# Patient Record
Sex: MALE | Race: WHITE | Employment: OTHER | URBAN - METROPOLITAN AREA
[De-identification: names, ages, dates, MRNs, and addresses within clinical notes are randomized per-mention and may not be internally consistent; named-entity substitution may affect disease eponyms.]

---

## 2017-01-01 ENCOUNTER — APPOINTMENT (OUTPATIENT)
Dept: GENERAL RADIOLOGY | Facility: HOSPITAL | Age: 82
DRG: 308 | End: 2017-01-01
Attending: EMERGENCY MEDICINE
Payer: MEDICARE

## 2017-01-01 ENCOUNTER — TELEPHONE (OUTPATIENT)
Dept: INTERVENTIONAL RADIOLOGY/VASCULAR | Facility: HOSPITAL | Age: 82
End: 2017-01-01

## 2017-01-01 ENCOUNTER — TELEPHONE (OUTPATIENT)
Dept: HEMATOLOGY/ONCOLOGY | Facility: HOSPITAL | Age: 82
End: 2017-01-01

## 2017-01-01 ENCOUNTER — OFFICE VISIT (OUTPATIENT)
Dept: HEMATOLOGY/ONCOLOGY | Age: 82
End: 2017-01-01
Attending: INTERNAL MEDICINE
Payer: MEDICARE

## 2017-01-01 ENCOUNTER — HOSPITAL ENCOUNTER (INPATIENT)
Facility: HOSPITAL | Age: 82
LOS: 3 days | Discharge: HOME | DRG: 308 | End: 2017-01-01
Attending: EMERGENCY MEDICINE | Admitting: HOSPITALIST
Payer: MEDICARE

## 2017-01-01 ENCOUNTER — APPOINTMENT (OUTPATIENT)
Dept: CV DIAGNOSTICS | Facility: HOSPITAL | Age: 82
DRG: 308 | End: 2017-01-01
Attending: INTERNAL MEDICINE
Payer: MEDICARE

## 2017-01-01 ENCOUNTER — HOSPITAL ENCOUNTER (OUTPATIENT)
Dept: NUCLEAR MEDICINE | Facility: HOSPITAL | Age: 82
Discharge: HOME | End: 2017-01-01
Attending: INTERNAL MEDICINE
Payer: COMMERCIAL

## 2017-01-01 VITALS
TEMPERATURE: 98 F | DIASTOLIC BLOOD PRESSURE: 80 MMHG | RESPIRATION RATE: 17 BRPM | HEIGHT: 69 IN | WEIGHT: 135.88 LBS | HEART RATE: 93 BPM | SYSTOLIC BLOOD PRESSURE: 118 MMHG | BODY MASS INDEX: 20.12 KG/M2 | OXYGEN SATURATION: 98 %

## 2017-01-01 VITALS
WEIGHT: 137 LBS | SYSTOLIC BLOOD PRESSURE: 100 MMHG | HEART RATE: 78 BPM | OXYGEN SATURATION: 100 % | TEMPERATURE: 97 F | DIASTOLIC BLOOD PRESSURE: 68 MMHG | BODY MASS INDEX: 20 KG/M2 | RESPIRATION RATE: 18 BRPM

## 2017-01-01 DIAGNOSIS — C61 PROSTATE CANCER (HCC): Primary | ICD-10-CM

## 2017-01-01 DIAGNOSIS — Z85.01 PERSONAL HISTORY OF ESOPHAGEAL CANCER: ICD-10-CM

## 2017-01-01 DIAGNOSIS — I48.91 ATRIAL FIBRILLATION WITH RAPID VENTRICULAR RESPONSE (HCC): Primary | ICD-10-CM

## 2017-01-01 DIAGNOSIS — C61 PROSTATE CANCER (HCC): ICD-10-CM

## 2017-01-01 DIAGNOSIS — R73.9 HYPERGLYCEMIA: ICD-10-CM

## 2017-01-01 DIAGNOSIS — I48.91 ATRIAL FIBRILLATION (HCC): ICD-10-CM

## 2017-01-01 DIAGNOSIS — R63.4 WEIGHT LOSS: ICD-10-CM

## 2017-01-01 DIAGNOSIS — F03.90 DEMENTIA WITHOUT BEHAVIORAL DISTURBANCE, UNSPECIFIED DEMENTIA TYPE (HCC): ICD-10-CM

## 2017-01-01 DIAGNOSIS — R77.8 ELEVATED TROPONIN: ICD-10-CM

## 2017-01-01 DIAGNOSIS — D69.6 THROMBOCYTOPENIA (HCC): ICD-10-CM

## 2017-01-01 PROCEDURE — 93306 TTE W/DOPPLER COMPLETE: CPT

## 2017-01-01 PROCEDURE — 99214 OFFICE O/P EST MOD 30 MIN: CPT | Performed by: INTERNAL MEDICINE

## 2017-01-01 PROCEDURE — 99223 1ST HOSP IP/OBS HIGH 75: CPT | Performed by: INTERNAL MEDICINE

## 2017-01-01 PROCEDURE — 99232 SBSQ HOSP IP/OBS MODERATE 35: CPT | Performed by: INTERNAL MEDICINE

## 2017-01-01 PROCEDURE — 99239 HOSP IP/OBS DSCHRG MGMT >30: CPT | Performed by: INTERNAL MEDICINE

## 2017-01-01 PROCEDURE — 78399 UNLISTED MUSCSKEL PX DX NUC: CPT

## 2017-01-01 PROCEDURE — 78320 NM BONE SPECT WITH CT (CPT=78306/78320/78399): CPT

## 2017-01-01 PROCEDURE — 78306 BONE IMAGING WHOLE BODY: CPT

## 2017-01-01 PROCEDURE — 71010 XR CHEST AP PORTABLE  (CPT=71010): CPT

## 2017-01-01 PROCEDURE — 93306 TTE W/DOPPLER COMPLETE: CPT | Performed by: INTERNAL MEDICINE

## 2017-01-01 RX ORDER — FUROSEMIDE 20 MG/1
20 TABLET ORAL
Status: ON HOLD | COMMUNITY
End: 2017-01-01

## 2017-01-01 RX ORDER — ASPIRIN 81 MG/1
81 TABLET, CHEWABLE ORAL DAILY
Status: DISCONTINUED | OUTPATIENT
Start: 2017-01-01 | End: 2017-01-01

## 2017-01-01 RX ORDER — SODIUM PHOSPHATE, DIBASIC AND SODIUM PHOSPHATE, MONOBASIC 7; 19 G/133ML; G/133ML
1 ENEMA RECTAL ONCE AS NEEDED
Status: ACTIVE | OUTPATIENT
Start: 2017-01-01 | End: 2017-01-01

## 2017-01-01 RX ORDER — LORAZEPAM 2 MG/ML
0.5 INJECTION INTRAMUSCULAR EVERY 30 MIN PRN
Status: DISCONTINUED | OUTPATIENT
Start: 2017-01-01 | End: 2017-01-01

## 2017-01-01 RX ORDER — MELATONIN
100 DAILY
Status: DISCONTINUED | OUTPATIENT
Start: 2017-01-01 | End: 2017-01-01

## 2017-01-01 RX ORDER — DILTIAZEM HYDROCHLORIDE 120 MG/1
120 CAPSULE, EXTENDED RELEASE ORAL DAILY
Status: DISCONTINUED | OUTPATIENT
Start: 2017-01-01 | End: 2017-01-01

## 2017-01-01 RX ORDER — BISACODYL 10 MG
10 SUPPOSITORY, RECTAL RECTAL
Status: DISCONTINUED | OUTPATIENT
Start: 2017-01-01 | End: 2017-01-01

## 2017-01-01 RX ORDER — QUETIAPINE 25 MG/1
25 TABLET, FILM COATED ORAL NIGHTLY PRN
Status: DISCONTINUED | OUTPATIENT
Start: 2017-01-01 | End: 2017-01-01

## 2017-01-01 RX ORDER — LISINOPRIL 2.5 MG/1
5 TABLET ORAL DAILY
Status: DISCONTINUED | OUTPATIENT
Start: 2017-01-01 | End: 2017-01-01

## 2017-01-01 RX ORDER — HYDROCODONE BITARTRATE AND ACETAMINOPHEN 10; 325 MG/1; MG/1
1 TABLET ORAL EVERY 6 HOURS PRN
Status: DISCONTINUED | OUTPATIENT
Start: 2017-01-01 | End: 2017-01-01

## 2017-01-01 RX ORDER — FOLIC ACID 1 MG/1
1 TABLET ORAL DAILY
Qty: 30 TABLET | Refills: 0 | Status: SHIPPED | OUTPATIENT
Start: 2017-01-01

## 2017-01-01 RX ORDER — CHOLECALCIFEROL (VITAMIN D3) 125 MCG
2000 CAPSULE ORAL DAILY
COMMUNITY

## 2017-01-01 RX ORDER — LISINOPRIL 5 MG/1
5 TABLET ORAL DAILY
Qty: 30 TABLET | Refills: 11 | Status: SHIPPED | OUTPATIENT
Start: 2017-01-01

## 2017-01-01 RX ORDER — POTASSIUM CITRATE 10 MEQ/1
10 TABLET, EXTENDED RELEASE ORAL SEE ADMIN INSTRUCTIONS
Status: ON HOLD | COMMUNITY
End: 2017-01-01

## 2017-01-01 RX ORDER — ACETAMINOPHEN 325 MG/1
650 TABLET ORAL EVERY 6 HOURS PRN
Status: DISCONTINUED | OUTPATIENT
Start: 2017-01-01 | End: 2017-01-01

## 2017-01-01 RX ORDER — SODIUM CHLORIDE 9 MG/ML
INJECTION, SOLUTION INTRAVENOUS ONCE
Status: COMPLETED | OUTPATIENT
Start: 2017-01-01 | End: 2017-01-01

## 2017-01-01 RX ORDER — HEPARIN SODIUM 5000 [USP'U]/ML
5000 INJECTION, SOLUTION INTRAVENOUS; SUBCUTANEOUS EVERY 12 HOURS
Status: DISCONTINUED | OUTPATIENT
Start: 2017-01-01 | End: 2017-01-01

## 2017-01-01 RX ORDER — POLYETHYLENE GLYCOL 3350 17 G/17G
17 POWDER, FOR SOLUTION ORAL DAILY PRN
Status: DISCONTINUED | OUTPATIENT
Start: 2017-01-01 | End: 2017-01-01

## 2017-01-01 RX ORDER — DILTIAZEM HYDROCHLORIDE 120 MG/1
120 CAPSULE, COATED, EXTENDED RELEASE ORAL DAILY
COMMUNITY

## 2017-01-01 RX ORDER — CARVEDILOL 6.25 MG/1
6.25 TABLET ORAL 2 TIMES DAILY WITH MEALS
Status: DISCONTINUED | OUTPATIENT
Start: 2017-01-01 | End: 2017-01-01

## 2017-01-01 RX ORDER — MELATONIN
100 DAILY
Qty: 30 TABLET | Refills: 0 | Status: SHIPPED | OUTPATIENT
Start: 2017-01-01 | End: 2017-01-01

## 2017-01-01 RX ORDER — ONDANSETRON 2 MG/ML
4 INJECTION INTRAMUSCULAR; INTRAVENOUS EVERY 6 HOURS PRN
Status: DISCONTINUED | OUTPATIENT
Start: 2017-01-01 | End: 2017-01-01

## 2017-01-01 RX ORDER — HYDROMORPHONE HYDROCHLORIDE 1 MG/ML
0.5 INJECTION, SOLUTION INTRAMUSCULAR; INTRAVENOUS; SUBCUTANEOUS
Status: DISCONTINUED | OUTPATIENT
Start: 2017-01-01 | End: 2017-01-01

## 2017-01-01 RX ORDER — HYDROCODONE BITARTRATE AND ACETAMINOPHEN 10; 325 MG/1; MG/1
2 TABLET ORAL EVERY 6 HOURS PRN
Status: DISCONTINUED | OUTPATIENT
Start: 2017-01-01 | End: 2017-01-01

## 2017-01-01 RX ORDER — FOLIC ACID 1 MG/1
1 TABLET ORAL DAILY
Qty: 30 TABLET | Refills: 0 | Status: SHIPPED | OUTPATIENT
Start: 2017-01-01 | End: 2017-01-01

## 2017-01-01 RX ORDER — FOLIC ACID 1 MG/1
1 TABLET ORAL DAILY
Status: DISCONTINUED | OUTPATIENT
Start: 2017-01-01 | End: 2017-01-01

## 2017-01-01 RX ORDER — LISINOPRIL 10 MG/1
10 TABLET ORAL EVERY EVENING
Status: ON HOLD | COMMUNITY
End: 2017-01-01

## 2017-01-01 RX ORDER — MELATONIN
100 DAILY
Qty: 30 TABLET | Refills: 0 | Status: SHIPPED | OUTPATIENT
Start: 2017-01-01

## 2017-01-01 RX ORDER — DILTIAZEM HYDROCHLORIDE 5 MG/ML
10 INJECTION INTRAVENOUS
Status: DISPENSED | OUTPATIENT
Start: 2017-01-01 | End: 2017-01-01

## 2017-01-01 RX ORDER — LORAZEPAM 2 MG/ML
1 INJECTION INTRAMUSCULAR EVERY 30 MIN PRN
Status: DISCONTINUED | OUTPATIENT
Start: 2017-01-01 | End: 2017-01-01

## 2017-01-01 RX ORDER — HALOPERIDOL 5 MG/ML
0.5 INJECTION INTRAMUSCULAR EVERY 6 HOURS PRN
Status: DISCONTINUED | OUTPATIENT
Start: 2017-01-01 | End: 2017-01-01

## 2017-01-01 RX ORDER — GABAPENTIN 100 MG/1
100 CAPSULE ORAL 3 TIMES DAILY
Status: DISCONTINUED | OUTPATIENT
Start: 2017-01-01 | End: 2017-01-01

## 2017-01-01 RX ORDER — DILTIAZEM HYDROCHLORIDE 120 MG/1
120 CAPSULE, COATED, EXTENDED RELEASE ORAL DAILY
COMMUNITY
End: 2017-01-01

## 2017-01-01 RX ORDER — CARVEDILOL 6.25 MG/1
6.25 TABLET ORAL 2 TIMES DAILY WITH MEALS
COMMUNITY

## 2017-01-09 ENCOUNTER — PRIOR ORIGINAL RECORDS (OUTPATIENT)
Dept: OTHER | Age: 82
End: 2017-01-09

## 2017-01-09 ENCOUNTER — MYAURORA ACCOUNT LINK (OUTPATIENT)
Dept: OTHER | Age: 82
End: 2017-01-09

## 2017-01-09 NOTE — PROGRESS NOTES
Met with patient and his family in Dr. Indio Lynch clinic. WATCHMAN process and procedure reviewed. Educational folder given, along with contact information. Patient and family to decide together if they would like to move forward.

## 2017-01-13 ENCOUNTER — PRIOR ORIGINAL RECORDS (OUTPATIENT)
Dept: OTHER | Age: 82
End: 2017-01-13

## 2017-04-05 PROBLEM — D69.6 THROMBOCYTOPENIA (HCC): Status: ACTIVE | Noted: 2017-01-01

## 2017-04-05 PROBLEM — Z91.81 AT RISK FOR FALLING: Status: ACTIVE | Noted: 2017-01-01

## 2017-04-05 PROBLEM — R73.9 HYPERGLYCEMIA: Status: ACTIVE | Noted: 2017-01-01

## 2017-04-05 PROBLEM — R77.8 ELEVATED TROPONIN: Status: ACTIVE | Noted: 2017-01-01

## 2017-04-05 PROBLEM — I48.91 ATRIAL FIBRILLATION WITH RAPID VENTRICULAR RESPONSE (HCC): Status: ACTIVE | Noted: 2017-01-01

## 2017-04-05 NOTE — ED INITIAL ASSESSMENT (HPI)
Patient with decreased appetite, more fatigued in the morning.  Patient is more SOB and increasing UNGER

## 2017-04-05 NOTE — ED PROVIDER NOTES
Patient Seen in: [facility] 3ne-a    History   Patient presents with:  Dyspnea BRAN SOB (respiratory)    Stated Complaint: dyspnea    HPI    60-year-old white male who presents the emergency room today for complaint of dyspnea.   Patient currently bee above.    PSFH elements reviewed from today and agreed except as otherwise stated in HPI.     Physical Exam     ED Triage Vitals   BP 04/05/17 1155 99/77 mmHg   Pulse 04/05/17 1155 119   Resp 04/05/17 1155 18   Temp 04/05/17 1155 98.1 °F (36.7 °C)   Temp sr within normal limits   CBC WITH DIFFERENTIAL WITH PLATELET    Narrative: The following orders were created for panel order CBC WITH DIFFERENTIAL WITH PLATELET.   Procedure                               Abnormality         Status                     ---- telemetry bed for further workup treatment and evaluation. Cardiology did come down and see and evaluate the patient.         Disposition and Plan     Clinical Impression:  Atrial fibrillation with rapid ventricular response (HCC)  (primary encounter diagn

## 2017-04-05 NOTE — HISTORICAL OFFICE NOTE
Lazaro Evans  : 1927  ACCOUNT:  [id number]  [phone]  PCP: Dr. Alec Navarro     TODAY'S DATE: 2016  DICTATED BY:  Clement Conway MD]    CHIEF COMPLAINT: [Followup of Coumadin Management, [facility] and Followup of Unspecified atrial fibrillatio 143, Height - 68, BMI - 21.7 ]    CONS: well developed, well nourished. WEIGHT: BMI parameters reviewed and discussed. HEAD/FACE: no trauma and normocephalic. EYES: conjunctivae not injected and no xanthelasma.  ENT: mucosa pink and moist. NECK: jugular beulah

## 2017-04-05 NOTE — CONSULTS
[facility]  Report of Consultation    January Yeh Patient Status:  Inpatient    1927 MRN [id number]   [facility] 3NE-A Attending Latisha Hood MD   Hosp Day # 0 PCP Taryn Kyle MD     Reason for Consultation: atrial fibri Medications:  DilTIAZem HCl (CARDIZEM) injection 10 mg 10 mg Intravenous Q1H PRN   diltiazem 100mg/100ml in NaCl (CARDIZEM) IVPB add-vantage 2.5-25 mg/hr Intravenous Continuous   Heparin Sodium (Porcine) 5000 UNIT/ML injection 5,000 Units 5,000 Units Subcu Assessment/Plan:  Patient Active Problem List:     Pain     Fatigue     Personal history of esophageal cancer     Prostate cancer ([facility] [address]     History of elevated PSA     Atrial fibrillation with rapid ventricular response (HCC)     Elevated troponin

## 2017-04-05 NOTE — HISTORICAL OFFICE NOTE
Alanaesthela Tellez  : 1927  ACCOUNT:  [id number]  [id number]  PCP: Dr. Kostas Schreiber     TODAY'S DATE: 2017  DICTATED BY:  Zack Rizvi M.D.]    CHIEF COMPLAINT: [Discuss WATCHMAN.]    HPI:  [On 2017, Solomon Moore, an 80year old male, pr 21.1]    DECISION MAKING:   Mr. Bright Hensley has a [id number] score of at least 5 (age, history of hypertension, cardiomyopathy, and prior TIA), with chronic atrial fibrillation, recently multiple episodes of falling.   He recently had an injury requiring stit Zack Rizvi M.D. MSa/mathieu-Job ID: [id number]  D: 01/09/2017  T: 01/11/2017  C: Dr. Kostas Schreiber

## 2017-04-05 NOTE — PROGRESS NOTES
04/05/17 1714   Clinical Encounter Type   Visited With Patient and family together   Continue Visiting No   Patient's Supportive Strategies/Resources ( assessed the spiritual resources, family supports and patient's own hopes and fears.  Patient

## 2017-04-05 NOTE — H&P
[facility]PITAL[facility]  History and Physical     Shaun Fide Patient Status:  Emergency    1927 MRN [id number]   Location [facility] Attending Alycia Gray MD   Hosp Day # 0 PCP Nicole Cleveland MD     Chief Complaint: Disp: 30 tablet Rfl: 5   lisinopril (PRINIVIL,ZESTRIL) 20 MG Oral Tab Take 1 tablet by mouth once daily.  (Patient taking differently: Take 10 mg by mouth once daily.  ) Disp: 90 tablet Rfl: 3       Review of Systems:   A comprehensive 14 point review of sy but with level continue monitor closely repeat   Echo, cardiology evaluation, repeat EKG  3. Congestive heart failure - acute on chronic -patient initially hypotensive on Dil drip and was given fluids in the ER. Will hold fluids for now.   Continue monitor

## 2017-04-06 ENCOUNTER — PRIOR ORIGINAL RECORDS (OUTPATIENT)
Dept: OTHER | Age: 82
End: 2017-04-06

## 2017-04-06 NOTE — CM/SW NOTE
SW found patient thru casefinding for readmission risk and PT recommendation for acute rehab. SW met with patient and patient's daughter for assessment and d/c planning. Pt's spouse is currently admitted to room 3605.     Pt is an 80 y.o male admitted on 04

## 2017-04-06 NOTE — PLAN OF CARE
Impaired Functional Mobility    • Achieve highest/safest level of mobility/gait Adequate for Discharge          CARDIOVASCULAR - ADULT    • Maintains optimal cardiac output and hemodynamic stability Progressing    • Absence of cardiac arrhythmias or at bas

## 2017-04-06 NOTE — PHYSICAL THERAPY NOTE
PHYSICAL THERAPY QUICK EVALUATION - INPATIENT    Room Number: 1839/0452-I  Evaluation Date: 4/6/2017  Presenting Problem: Afib with RVR  Physician Order: PT Eval and Treat    Problem List  Principal Problem:    Atrial fibrillation with rapid ventricular re blankets)?: None   -   Sitting down on and standing up from a chair with arms (e.g., wheelchair, bedside commode, etc.): None   -   Moving from lying on back to sitting on the side of the bed?: None   How much help from another person does the patient curr

## 2017-04-06 NOTE — PROGRESS NOTES
[facility]  Cardiology Progress Note    January Yeh Patient Status:  Inpatient    1927 MRN [id number]   [facility] 3NE-A Attending Serena Johnson MD   [address] Day # 1 PCP Taryn Kyle MD     Subjective:  Dyspnea has improved.  No regurgitation. 9. Pulmonary arteries: Systolic pressure was mildly to moderately increased,     in the range of 40mm Hg to 45mm Hg. *    Physical Exam:  General: Alert and oriented in no apparent distress.   Cardiac: irregular rate and rhythm, S1, S2 norm

## 2017-04-06 NOTE — PLAN OF CARE
Assumed patient care at 0. Patient A&O x 4 with some forgetfulness noted at times. No complaints of pain or discomfort at this time. VSS. HR increased into the 120's- 130's when patient is up and ambulating. Cardizem drip running at 5 mL/hr.  HR in the u

## 2017-04-06 NOTE — PROGRESS NOTES
[facility]  Progress Note     Robe Fry Patient Status:  Inpatient    1927 MRN [id number]   [facility] 3NE-A Attending Nicolas Reed MD   Hosp Day # 1 PCP Brittany Isidro MD     Chief Complaint: fatigue    S: Patient doing 1 mg Oral Daily   • Thiamine HCl  100 mg Oral Daily       ASSESSMENT / PLAN:     1. A. fib with RVR    diltiazem drip per cardiology, appreciate rec on restart oral meds   Patient is on carvedilol and dilt at home   Not a full AC candidate, okay for VTE pp

## 2017-04-07 NOTE — PROGRESS NOTES
[facility]  Progress Note     Ignacia Martel Patient Status:  Inpatient    1927 MRN [id number]   [facility] 3NE-A Attending Natalia Winchester MD   Hosp Day # 2 PCP Tobi Sanchez MD     Chief Complaint: fatigue    S: Patient doing • aspirin  81 mg Oral Daily   • folic acid  1 mg Oral Daily   • Thiamine HCl  100 mg Oral Daily       ASSESSMENT / PLAN:     1. A. fib with RVR    On PO dilt, rate controlled, no beta blocker   Not a full AC candidate, okay for VTE ppx dose while in hous

## 2017-04-07 NOTE — PROGRESS NOTES
[facility] Cardiology Progress Note    Subjective:  Feels great, ready to go home. Doesn't remember ever having any chest pain. Looking forward to his 89th birthday this summer.      Objective:  /93 mmHg  Pulse 105  Temp(Src) 97.7 °F (36.5 °C) (Axillary)

## 2017-04-07 NOTE — DISCHARGE SUMMARY
[facility] HOSPITALIST  DISCHARGE SUMMARY     Ignacia Martel Patient Status:  Inpatient    1927 MRN [id number]   [facility] 3NE-A Attending Natalia Winchester MD   [facility] Day # 3 PCP Tobi Sanchez MD     Date of Admission: 2017  Date of Dis positive and viral respiratory PCR. Patient also troponin elevation but no chest pain and no significant EKG changes for signs of acute infarction.   Cardiology follow patient patient and family do not want any aggressive interventions done including miles above     Lab/Test results pending at Discharge:   · none    Consultants:  • Cardiology    Antibiotic: none   End date: none    Discharge Medication List:     Discharge Medications      START taking these medications       Instructions Prescription details to Get Your Medications      These medications were sent to [facility] [address] [facility] [employer] - [address] [place], [place] [address] [phone], [address] [facility]  [address] [place] [address]     Phone:  [phone]    - lisinopril 5 MG Tabs      Please

## 2017-04-07 NOTE — PLAN OF CARE
Pt is AOx3, forgetful and periods of confusion, but pleasant and follows commands with no problems. Very hard of hearing,  Room air. A-fib on tele. Bed alarm is on as patient is impulsive. Up with assist.  Will monitor.

## 2017-04-08 NOTE — PLAN OF CARE
CARDIOVASCULAR - ADULT    • Maintains optimal cardiac output and hemodynamic stability Progressing    • Absence of cardiac arrhythmias or at baseline Progressing          Impaired Functional Mobility    • Achieve highest/safest level of mobility/gait Progr

## 2017-04-08 NOTE — PLAN OF CARE
Pt alert and oriented. Denies pain. Productive cough. VSS. A fib on tele, HR on tele. O2 sats 98% on RA. Afebrile. Ambulating this AM, tolerating well. Plan for dc today. Will monitor.     CARDIOVASCULAR - ADULT    • Maintains optimal cardiac output and hem

## 2017-04-08 NOTE — PLAN OF CARE
NURSING DISCHARGE NOTE    Discharged home via wheelchair. Accompanied by family members. Belongings sent home with pt. Discharge instructions, f/u appointments discussed with pt, family members.  Pt instructed to have BMP completed prior to appoint

## 2017-04-08 NOTE — PROGRESS NOTES
[facility] Cardiology Progress Note    Subjective:  Feels good and wants to go home home.   Still with cough    Objective:  118/80  Afebrile  AFib 90s   I/O  Incomplete   No labs    Neuro:awake/alert  Cardiac:s1 s2 regular  Lungs: clear  Abdomen:soft  Ext

## 2017-04-10 ENCOUNTER — PRIOR ORIGINAL RECORDS (OUTPATIENT)
Dept: OTHER | Age: 82
End: 2017-04-10

## 2017-04-10 ENCOUNTER — MYAURORA ACCOUNT LINK (OUTPATIENT)
Dept: OTHER | Age: 82
End: 2017-04-10

## 2017-04-12 LAB
ALBUMIN: 3.5 G/DL
ALKALINE PHOSPHATATE(ALK PHOS): 68 IU/L
BILIRUBIN TOTAL: 1.6 MG/DL
BUN: 29 MG/DL
BUN: 39 MG/DL
CALCIUM: 8.3 MG/DL
CALCIUM: 9.3 MG/DL
CHLORIDE: 105 MEQ/L
CHLORIDE: 99 MEQ/L
CHOLESTEROL, TOTAL: 112 MG/DL
CREATININE KINASE: 164 U/L
CREATININE, SERUM: 1.41 MG/DL
CREATININE, SERUM: 1.96 MG/DL
GLUCOSE: 140 MG/DL
GLUCOSE: 230 MG/DL
HDL CHOLESTEROL: 55 MG/DL
HEMATOCRIT: 42.7 %
HEMOGLOBIN: 14.4 G/DL
LDL CHOLESTEROL: 42 MG/DL
MAGNESIUM: 1.9 MG/DL
PLATELETS: 125 K/UL
POTASSIUM, SERUM: 4.2 MEQ/L
POTASSIUM, SERUM: 4.8 MEQ/L
PROBNP: NORMAL PG/ML
PROTEIN, TOTAL: 7.9 G/DL
RED BLOOD COUNT: 4.54 X 10-6/U
SGOT (AST): 33 IU/L
SGPT (ALT): 19 IU/L
SODIUM: 136 MEQ/L
SODIUM: 138 MEQ/L
TRIGLYCERIDES: 73 MG/DL
WHITE BLOOD COUNT: 7 X 10-3/U

## 2017-04-13 NOTE — CM/SW NOTE
Pt d/c 04/08 home declining needs. 04/13/17 1300   Discharge disposition   Discharged to: Home or [address] [place] [address] services after discharge Patient refused services; None   Discharge transportation Private car

## 2017-04-19 ENCOUNTER — PRIOR ORIGINAL RECORDS (OUTPATIENT)
Dept: OTHER | Age: 82
End: 2017-04-19

## 2017-04-20 NOTE — PROGRESS NOTES
Pt here for MD f/u. Pt and daughter state pt's memory is lacking. Energy level has been low, appetite is fair, slowly improving. Pt denies pain. Pt has no further complaints.      Education Record    Learner:  Patient    Disease / Diagnosis:    Barriers / L

## 2017-04-25 NOTE — PROGRESS NOTES
[facility]    PATIENT'S NAME: Thomas Sheryl   ATTENDING PHYSICIAN: Woody Zhou M.D.    PATIENT ACCOUNT #: [de-identified] LOCATION: [address] [place] [address] RECORD #: [id number] YOB: 1927   DATE OF SERVICE: 04/20/2017       CANCER MARISA 120 mg daily, carvedilol 6.25 mg b.i.d., vitamin D3 at 2000 units daily, furosemide 20 mg daily, potassium citrate 10 mEq daily. He sees Dr. Catarino Lezama for his primary care. He sees Dr. Sly Chong for his cardiology followup.   He did see Dr. Catarino Lezama once in [facility] if that did not result in the control of his PSA, then he might be a candidate for secondary hormonal therapy with an agent such as abiraterone, prednisone, enzalutamide. From a functional standpoint, he is much weaker and his memory is relatively poor.

## 2017-04-28 NOTE — TELEPHONE ENCOUNTER
Spoke with Kathleen Canela, his daughter about bone scan. Doesn't show any prostate cancer. Given his current weakness, anorexia and weight loss and the overall mental decline (memory issues) I would agree that hospice is reasonable.   If he were younger I would con

## 2019-03-01 VITALS
SYSTOLIC BLOOD PRESSURE: 144 MMHG | HEART RATE: 76 BPM | HEIGHT: 68 IN | DIASTOLIC BLOOD PRESSURE: 90 MMHG | WEIGHT: 139 LBS | BODY MASS INDEX: 21.07 KG/M2

## 2019-03-01 VITALS — HEART RATE: 96 BPM | WEIGHT: 136 LBS | DIASTOLIC BLOOD PRESSURE: 70 MMHG | SYSTOLIC BLOOD PRESSURE: 104 MMHG

## (undated) NOTE — IP AVS SNAPSHOT
[facility] [place] [address] [place], [address] ~ [phone]                Discharge Summary   4/5/2017    Dewain Starch           Admission Information        Provider Department    4/5/2017 Betty Soria MD  [facility]ne[facility] Carlos Filler taking these medications        Instructions Authorizing Provider    Morning Afternoon Evening As Needed    aspirin 81 MG Tabs   Next dose due:  4/9 AM        Take 1 tablet (81 mg total) by mouth daily. HEART FAILURE, DISCHARGE INSTRUCTIONS FOR (ENGLISH)    HEART FAILURE, WHAT IS (ENGLISH)      Follow-up Information     Follow up with Ashkan Ash MD On 4/13/2017.     Specialties:  Family Medicine, IP Consult to Primary Care    Why:  Follow up with  HgbA1C Glucose BUN Creatinine Calcium Alkaline Phosph AST    (04/05/17)  5.8 (H) (04/06/17)  140 (H) (04/06/17)  39 (H) (04/06/17)  1.41 (H) (04/06/17)  8.3 (04/05/17)  68 (04/05/17)  33      Metabolic Lab Results  (Last result in the past 90 days)    ALT As your caregivers, we want you to be aware of the medications you are prescribed to take and their potential SIDE EFFECTS. Your nurse will review your medications with you before you are discharged, and can provide you with additional printed information. Cholecalciferol (VITAMIN D3) 2000 units Oral Tab

## (undated) NOTE — MR AVS SNAPSHOT
After Visit Summary   4/20/2017    Estevan Pemberton    MRN: [id number]           Diagnoses this Visit     Prostate cancer [facility])    -  Primary       Allergies     No Known Allergies      Your Vital Signs Were     BP Pulse Temp(Src) Resp Weight SpO2